# Patient Record
Sex: MALE | Race: WHITE | NOT HISPANIC OR LATINO | Employment: UNEMPLOYED | URBAN - METROPOLITAN AREA
[De-identification: names, ages, dates, MRNs, and addresses within clinical notes are randomized per-mention and may not be internally consistent; named-entity substitution may affect disease eponyms.]

---

## 2017-01-06 ENCOUNTER — COMMUNICATION - HEALTHEAST (OUTPATIENT)
Dept: FAMILY MEDICINE | Facility: CLINIC | Age: 32
End: 2017-01-06

## 2017-01-06 DIAGNOSIS — F41.1 ANXIETY STATE: ICD-10-CM

## 2017-02-07 ENCOUNTER — RECORDS - HEALTHEAST (OUTPATIENT)
Dept: ADMINISTRATIVE | Facility: OTHER | Age: 32
End: 2017-02-07

## 2017-05-27 ENCOUNTER — COMMUNICATION - HEALTHEAST (OUTPATIENT)
Dept: FAMILY MEDICINE | Facility: CLINIC | Age: 32
End: 2017-05-27

## 2017-05-27 DIAGNOSIS — F41.1 ANXIETY STATE: ICD-10-CM

## 2017-06-12 ENCOUNTER — COMMUNICATION - HEALTHEAST (OUTPATIENT)
Dept: FAMILY MEDICINE | Facility: CLINIC | Age: 32
End: 2017-06-12

## 2017-07-03 ENCOUNTER — COMMUNICATION - HEALTHEAST (OUTPATIENT)
Dept: TELEHEALTH | Facility: CLINIC | Age: 32
End: 2017-07-03

## 2017-07-25 ENCOUNTER — OFFICE VISIT - HEALTHEAST (OUTPATIENT)
Dept: FAMILY MEDICINE | Facility: CLINIC | Age: 32
End: 2017-07-25

## 2017-07-25 RX ORDER — LISINOPRIL 10 MG/1
10 TABLET ORAL DAILY
Qty: 90 TABLET | Refills: 1 | Status: SHIPPED | OUTPATIENT
Start: 2017-07-25 | End: 2022-04-05

## 2017-07-27 ENCOUNTER — COMMUNICATION - HEALTHEAST (OUTPATIENT)
Dept: FAMILY MEDICINE | Facility: CLINIC | Age: 32
End: 2017-07-27

## 2017-10-25 ENCOUNTER — COMMUNICATION - HEALTHEAST (OUTPATIENT)
Dept: FAMILY MEDICINE | Facility: CLINIC | Age: 32
End: 2017-10-25

## 2017-10-25 DIAGNOSIS — F41.1 ANXIETY STATE: ICD-10-CM

## 2018-05-31 ENCOUNTER — RECORDS - HEALTHEAST (OUTPATIENT)
Dept: ADMINISTRATIVE | Facility: OTHER | Age: 33
End: 2018-05-31

## 2019-11-07 ENCOUNTER — RECORDS - HEALTHEAST (OUTPATIENT)
Dept: ADMINISTRATIVE | Facility: OTHER | Age: 34
End: 2019-11-07

## 2021-05-05 ENCOUNTER — RECORDS - HEALTHEAST (OUTPATIENT)
Dept: ADMINISTRATIVE | Facility: OTHER | Age: 36
End: 2021-05-05

## 2021-05-31 VITALS — WEIGHT: 205 LBS

## 2021-06-12 NOTE — PROGRESS NOTES
"CHIEF COMPLAINT: Cheikh Mi had concerns including Follow-up.    [place]: 1.............. had concerns including Follow-up.    1. Elevated blood pressure      No problem-specific Assessment & Plan notes found for this encounter.      CC:              F/u high BP, requesting BP med     What's it like:         Pt states high BP during dentist visit, requesting for BP medication to help.   How long is it ongoing:      Ongoing for long time, history of high BP   What makes it worse :         What makes it better:         Old BP medication helped last time pt was on medication   0/10-10/10:Pain/Intesity     0 no pain today       Associated Sx:   No other sx     Patient had several episodes of high blood pressure after being at the dentist  \"Every time he goes as high  No headaches, no dizziness, no fogginess, no palpitations,  No caffeine alcohol use  No exercise  No sleep disturbance or hypersomnia snoring or feeling poor rest  No falling asleep during class  Is currently on Vimpat as well as lamotrigine for seizures since 2011  Dad has hypertension  Occasionally uses marijuana    SUBJECTIVE:  Cheikh Mi is a 32 y.o. male    Past Medical History:   Diagnosis Date     Seizure disorder     2011  Vimpat/Lamotrigine Neuro Dr. Marshall     Past Surgical History:   Procedure Laterality Date     SPINE SURGERY  age  days     Review of patient's allergies indicates no known allergies.  Current Outpatient Prescriptions   Medication Sig Dispense Refill     FLUoxetine (PROZAC) 20 MG capsule TAKE 1 CAPSULE BY MOUTH ONE TIME DAILY 90 capsule 0     ergocalciferol (ERGOCALCIFEROL) 50,000 unit capsule 1 every SUNday 12 capsule 3     lisinopril (PRINIVIL,ZESTRIL) 10 MG tablet Take 1 tablet (10 mg total) by mouth daily. 90 tablet 1     multivitamin therapeutic w/minerals (THERAPEUTIC-M) 27-0.4 mg Tab tablet 1 daily 100 tablet 3     omega-3 fatty acids-vitamin E (FISH OIL) 1,000 mg cap 2 Capsules Daily 60 each 12     No current " facility-administered medications for this visit.      Family History   Problem Relation Age of Onset     Hypertension Mother      Breast cancer Mother      Stage 2      Mental illness Sister      Social History     Social History     Marital status: Single     Spouse name: N/A     Number of children: N/A     Years of education: N/A     Social History Main Topics     Smoking status: Former Smoker     Quit date: 4/7/2015     Smokeless tobacco: Never Used     Alcohol use No     Drug use: Yes     Special: Marijuana     Sexual activity: Yes     Partners: Female     Birth control/ protection: OCP     Other Topics Concern     None     Social History Narrative     Patient Active Problem List   Diagnosis     Anxiety Disorder NOS     Primary Hypersomnia     Dental Disorders     Convulsions     Chronic Sinusitis                                              SOCIAL: He  reports that he quit smoking about 2 years ago. He has never used smokeless tobacco. He reports that he uses illicit drugs, including Marijuana. He reports that he does not drink alcohol.    REVIEW OF SYSTEMS:     FAMILY HISTORY NOT PERTINENT TO CHIEF COMPLAINT OR PRESENTING PROBLEM  Review of systems otherwise negative as requested from patient, except   Positive ROS outlined and discussed in [place].    OBJECTIVE:  /74 (Patient Site: Right Arm, Patient Position: Sitting, Cuff Size: Adult Large)  Pulse (!) 57  Resp 16  Wt 205 lb (93 kg)  SpO2 98%    GENERAL:     No acute distress.   Alert and oriented X 3         Physical:    Carotid pulses are full without bruits  Lungs are clear  Cardiac S1-S2 regular sinus appreciable murmur  Patient's not tachycardic  No cervical or supraclavicular nodes  No tremulousness  No proptosis  Calves are supple no edema normal distal pulses        ASSESSMENT & PLAN      Cheikh was seen today for follow-up.    Diagnoses and all orders for this visit:    Elevated blood pressure  -     Comprehensive Metabolic Panel  -      Parathyroid Hormone Intact  -     Vitamin D, Total (25-Hydroxy)  -     Thyroid Cascade  -     Urinalysis-UC if Indicated  -     Prolactin    Other orders  -     lisinopril (PRINIVIL,ZESTRIL) 10 MG tablet; Take 1 tablet (10 mg total) by mouth daily.   benefits discussed of treatment will have the patient start supplements including fish oil CoQ ALA CoQ 10  FISH OIL    No Follow-up on file.       Anticipatory Guidance and Symptomatic Cares Discussed   Advised to call back directly if there are further questions, or if these symptoms fail to improve as anticipated or worsen.  Return to clinic if patient has a clinical concern that warrants an exam.        25 Min Total Time, > 50% counseling and coordination of Care    Edison Brar MD  Family Medicine   [place] [phone]

## 2022-03-31 ENCOUNTER — TELEPHONE (OUTPATIENT)
Dept: FAMILY MEDICINE | Facility: CLINIC | Age: 37
End: 2022-03-31
Payer: COMMERCIAL

## 2022-03-31 NOTE — TELEPHONE ENCOUNTER
Attempted to call patient, no answer. Left message to call clinic and schedule appointment ASAP as Dr Brar has a full schedule.

## 2022-03-31 NOTE — TELEPHONE ENCOUNTER
Reason for Call:  Same Day Appointment, Requested Provider:  Edison Brar    PCP: Edison Brar    Reason for visit: pt stated he needs to be seen asap. Needs a note for work stating he is dealing with depression and was off work for the last couple of days. Needs note by next week. Requesting to be seen tomorrow 4/1.    Duration of symptoms:     Have you been treated for this in the past?     Additional comments:     Can we leave a detailed message on this number? YES    Phone number patient can be reached at: [phone]    Best Time:     Call taken on 3/31/2022 at 1:20 PM by Janie Pratt

## 2022-04-05 ENCOUNTER — OFFICE VISIT (OUTPATIENT)
Dept: FAMILY MEDICINE | Facility: CLINIC | Age: 37
End: 2022-04-05
Payer: COMMERCIAL

## 2022-04-05 VITALS
SYSTOLIC BLOOD PRESSURE: 133 MMHG | WEIGHT: 181 LBS | OXYGEN SATURATION: 97 % | TEMPERATURE: 97.7 F | DIASTOLIC BLOOD PRESSURE: 89 MMHG | HEART RATE: 89 BPM | RESPIRATION RATE: 18 BRPM

## 2022-04-05 DIAGNOSIS — F41.1 ANXIETY STATE: ICD-10-CM

## 2022-04-05 DIAGNOSIS — F43.21 ADJUSTMENT DISORDER WITH DEPRESSED MOOD: Primary | ICD-10-CM

## 2022-04-05 DIAGNOSIS — R56.9 CONVULSIONS, UNSPECIFIED CONVULSION TYPE (H): ICD-10-CM

## 2022-04-05 PROCEDURE — 96127 BRIEF EMOTIONAL/BEHAV ASSMT: CPT | Performed by: FAMILY MEDICINE

## 2022-04-05 PROCEDURE — 99204 OFFICE O/P NEW MOD 45 MIN: CPT | Performed by: FAMILY MEDICINE

## 2022-04-05 RX ORDER — ESCITALOPRAM OXALATE 10 MG/1
TABLET ORAL
Qty: 90 TABLET | Refills: 1 | Status: SHIPPED | OUTPATIENT
Start: 2022-04-05 | End: 2022-10-16

## 2022-04-05 ASSESSMENT — PATIENT HEALTH QUESTIONNAIRE - PHQ9
SUM OF ALL RESPONSES TO PHQ QUESTIONS 1-9: 13
SUM OF ALL RESPONSES TO PHQ QUESTIONS 1-9: 13
10. IF YOU CHECKED OFF ANY PROBLEMS, HOW DIFFICULT HAVE THESE PROBLEMS MADE IT FOR YOU TO DO YOUR WORK, TAKE CARE OF THINGS AT HOME, OR GET ALONG WITH OTHER PEOPLE: VERY DIFFICULT

## 2022-04-05 ASSESSMENT — ANXIETY QUESTIONNAIRES
1. FEELING NERVOUS, ANXIOUS, OR ON EDGE: MORE THAN HALF THE DAYS
GAD7 TOTAL SCORE: 14
3. WORRYING TOO MUCH ABOUT DIFFERENT THINGS: MORE THAN HALF THE DAYS
5. BEING SO RESTLESS THAT IT IS HARD TO SIT STILL: MORE THAN HALF THE DAYS
GAD7 TOTAL SCORE: 14
7. FEELING AFRAID AS IF SOMETHING AWFUL MIGHT HAPPEN: SEVERAL DAYS
4. TROUBLE RELAXING: NEARLY EVERY DAY
6. BECOMING EASILY ANNOYED OR IRRITABLE: MORE THAN HALF THE DAYS
GAD7 TOTAL SCORE: 14
7. FEELING AFRAID AS IF SOMETHING AWFUL MIGHT HAPPEN: SEVERAL DAYS
2. NOT BEING ABLE TO STOP OR CONTROL WORRYING: MORE THAN HALF THE DAYS

## 2022-04-05 NOTE — PATIENT INSTRUCTIONS
Thanks for coming in Cheikh    Start the Lexapro 10 mg 1/2 tablet daily for 14 days then 1 tablet daily    Consider melatonin 5 mg at target bedtime I would say 945 for you    Stay active  Increase your fruit and vegetable intake  Shoot for 8 hours of sleep

## 2022-04-05 NOTE — PROGRESS NOTES
"Radhapamella Lujan   Clemente Salamanca is a 36 year old who presents for the following health issues        Mental Health Problem    History of Present Illness       Mental Health Follow-up:  Patient presents to follow-up on Depression & Anxiety.Patient's depression since last visit has been:  Worse  The patient is not having other symptoms associated with depression.  Patient's anxiety since last visit has been:  No change  The patient is not having other symptoms associated with anxiety.  Any significant life events: relationship concerns, job concerns and grief or loss  Patient is feeling anxious or having panic attacks.  Patient has no concerns about alcohol or drug use.       Today's PHQ-9         PHQ-9 Total Score: 13  PHQ-9 Q9 Thoughts of better off dead/self-harm past 2 weeks :   (P) Not at all    How difficult have these problems made it for you to do your work, take care of things at home, or get along with other people: Very difficult    Today's HOSSEIN-7 Score: 14    Hypertension: He presents for follow up of hypertension.  He does not check blood pressure  regularly outside of the clinic. Outside blood pressures have been over 140/90. He does not follow a low salt diet.     He eats 0-1 servings of fruits and vegetables daily.He consumes 2 sweetened beverage(s) daily.He exercises with enough effort to increase his heart rate 9 or less minutes per day.  He exercises with enough effort to increase his heart rate 3 or less days per week. He is missing 1 dose(s) of medications per week.     Needs letter for work stating he was seen today     Abnormal Mood Symptoms    Duration: depression for the past month maybe longer     Description:  Depression: YES  Anxiety: YES, \"I've always had that\".   Panic attacks: no     Accompanying signs and symptoms: see PHQ-9 and HOSSEIN scores    History (similar episodes/previous evaluation): None    Precipitating or alleviating factors: None    Therapies " tried and outcome: none. Stopped taking Fluoxetine about a year ago   Stopped taking seizure medications        Problem List Items Addressed This Visit        Nervous and Auditory    Convulsions     Last seizure 4/18/2015 just got his license back    Seizures for years  AB sounds  Grandma  Otherwise feeling well    Interested in therapy for depression  Always feels like that  No panic attacks  Stopped taking fluoxetine about a year ago does not feel it was helpful    Risk benefits discussed    No seizure therapy at the present time    For depression  Consider a therapist    Start Lexapro 10 mg 1/2 tablet daily for total of 14 days then 1 tablet daily    Connect by Jennifer                Other    Anxiety Disorder NOS     Currently more anxiety  Not able to see his kids  Frustrated because he is having difficulty making arrangements  Was living with his folks  They moved to [place]  Continue to work with his ask Shanel  Children doing well otherwise    Has tried fluoxetine in the past as a remember how he reacted    Not suicidal    Plan  Risk benefits discussed  Lexapro 5 mg daily for 2 weeks then  Lexapro 10 mg daily  Connect by Jennifer    Note for work today off 3/29/2022 through 4/5/2022                   Notes from today   for [employer]  3 kids go to  with ask ages to 11 and 12  Parents moved up [place]  No car  45 hours of sleep  Occasional cannabis no drugs only social drinking        Review of Systems   Down depressed        Objective    /89   Pulse 89   Temp 97.7  F (36.5  C) (Oral)   Resp 18   Wt 82.1 kg (181 lb)   SpO2 97%   There is no height or weight on file to calculate BMI.  Physical Exam

## 2022-04-05 NOTE — ASSESSMENT & PLAN NOTE
Last seizure 4/18/2015 just got his license back    Seizures for years  AB sounds  Grandma  Otherwise feeling well    Interested in therapy for depression  Always feels like that  No panic attacks  Stopped taking fluoxetine about a year ago does not feel it was helpful    Risk benefits discussed    No seizure therapy at the present time    For depression  Consider a therapist    Start Lexapro 10 mg 1/2 tablet daily for total of 14 days then 1 tablet daily    Connect by Jennifer

## 2022-04-05 NOTE — ASSESSMENT & PLAN NOTE
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

## 2022-04-06 ASSESSMENT — ANXIETY QUESTIONNAIRES: GAD7 TOTAL SCORE: 14

## 2022-04-06 ASSESSMENT — PATIENT HEALTH QUESTIONNAIRE - PHQ9: SUM OF ALL RESPONSES TO PHQ QUESTIONS 1-9: 13

## 2023-03-15 ENCOUNTER — OFFICE VISIT (OUTPATIENT)
Dept: URGENT CARE | Facility: URGENT CARE | Age: 38
End: 2023-03-15
Payer: COMMERCIAL

## 2023-03-15 VITALS
WEIGHT: 185 LBS | SYSTOLIC BLOOD PRESSURE: 160 MMHG | HEART RATE: 91 BPM | OXYGEN SATURATION: 99 % | TEMPERATURE: 100 F | DIASTOLIC BLOOD PRESSURE: 103 MMHG

## 2023-03-15 DIAGNOSIS — R03.0 ELEVATED BLOOD PRESSURE READING: ICD-10-CM

## 2023-03-15 DIAGNOSIS — M54.6 ACUTE MIDLINE THORACIC BACK PAIN: Primary | ICD-10-CM

## 2023-03-15 PROCEDURE — 99213 OFFICE O/P EST LOW 20 MIN: CPT

## 2023-03-15 RX ORDER — CYCLOBENZAPRINE HCL 10 MG
10 TABLET ORAL 3 TIMES DAILY PRN
Qty: 20 TABLET | Refills: 0 | Status: SHIPPED | OUTPATIENT
Start: 2023-03-15

## 2023-03-15 NOTE — PATIENT INSTRUCTIONS
Diagnosis: Back sprain/strain,    Plan:   RICE: rest, ice / heat - alternating, gentle stretching   Muscle relaxants}   Ibuprofen and tylenol for pain today (mobic, naproxen) (Celebrex - cox2- GI upset/bleeding)   Can try: creams and rubs- lidocaine, Voltaren   Lidocaine patches   Acute back pain from a sprain or strain usually gets better in 1 to 2 weeks.   Will refer you to spine for further follow up and treatment regarding your back pain - make apt, if better can cancel, if not keep and try:   Physical therapy, acupuncture, chiropractic, injections,   Ortho: will discuss stronger medications such as narcotics   Monitor for:   Monitor for pain that worsens and does not improve over 2-6 weeks   Numbness and tingling down leg   Loss of function of bowel or bladder   New fevers, chills, sweats   Increased weakness or loss of strength in legs     Back Pain   Back pain is one of the most common problems. The good news is that most people feel better in 1 to 2 weeks, and most of the rest in 1 to 2 months.   Most people can remain active.  People who have pain describe it differently--not everyone is the same.  The pain can be sharp, stabbing, shooting, aching, cramping or burning.  Movement, standing, bending, lifting, sitting, or walking may worsen pain.  It can be limited to one spot or area, or it can be more generalized.  It can spread upwards, to the front, or go down your arms or legs (sciatica).  It can cause muscle spasm.  Most of the time, mechanical problems with the muscles or spine cause the pain.   Mechanical problems are usually caused by an injury to the muscles or ligaments.   Illness can cause back pain, but it's usually not caused by a serious illness.   Mechanical problems include:   Physical activity such as sports, exercise, work, or normal activity  Overexertion, lifting, pushing, pulling incorrectly or too aggressively  Sudden twisting, bending, or stretching from an accident, or accidental  movement  Poor posture  Stretching or moving wrong, without noticing pain at the time  Poor coordination, lack of regular exercise (check with your doctor about this)  Spinal disc disease or arthritis  Stress  The pain can also be related to pregnancy, or illness like appendicitis, bladder or kidney infections, pelvic infections, and many other things.    Other things to try:   When in bed, try to find a position of comfort. A firm mattress is best. Try lying flat on your back with pillows under your knees. You can also try lying on your side with your  knees bent up toward your chest and a pillow between your knees.  Don't sit for long periods, as in a long car ride or during other travel. This puts more stress on the lower back than standing or walking.  You can alternate ice and heat therapy.   Therapeutic massage can help relax the back muscles without stretching them.  Be aware of safe lifting methods and don't lift anything without stretching first.

## 2023-03-15 NOTE — PROGRESS NOTES
URGENT CARE  Assessment & Plan   Assessment:   Cheikh Mi is a 37 year old male who's clinical presentation today is consistent with:   1. Acute midline thoracic back pain  - cyclobenzaprine (FLEXERIL) 10 MG tablet; Take 1 tablet (10 mg) by mouth 3 times daily as needed for muscle spasms  Dispense: 20 tablet; Refill: 0    No alarm signs or symptoms present   Differential Diagnoses for this patient's CC include    fracture, dislocation  Ligamentous vs tendon pathology, musculoskeletal injury, soft tissue injury    Plan:  Will treat patient at this time symptomatically and supportively, this will include encouraging: using NSAIDs/Tylenol  to help decrease pain and inflammation, resting, applying ice as needed.   Given the patient's presentation today suspect disc involvement and thus will have the patient try: Muscle relaxants. Further encouraged creams and rubs such as lidocaine or Voltaren    We discussed that acute back pain from a strain or sprain usually gets better in 1-2 weeks but back pain from disc disease, arthritis or spinal canal narrowing can last much longer, for this reason I encouraged the patient to  follow-up with their PCP for further evaluation and treatment if no improvement in the next 2-3 weeks. However if symptoms worsen they should follow up immediately; educated them to monitor for worsening symptoms such as: new weakness, numbness or tingling; new fever/chills, loss of strength in legs; or loss of bowel or bladder function.     Patient  is  agreeable to treatment plan and state they will follow-up if symptoms do not improve and/or if symptoms worsen (see patient's AVS 'monitor for' section for specific patient instructions given and discussed regarding what to watch for and when to follow up)    Medications ordered are listed above, please see AVS for patient's specific and personalized discharge instructions given     BRUCE Pulido [facility]  [facility]      ______________________________________________________________________        Subjective  Subjective     HPI: Cheikh Mi  is a 37 year old  male who presents today for evaluation the following concerns:   Patient presents with acute back pain which started 4 days ago, approximately 3/11/23   Patient states he fell while walking and slipped on the ice   Patient endorses pain that is not  radiating down his  legs   Patient denies any changes of bowel or bladder. Patient denies any numbness or tingling    The patient has tried resting at home  to help alleviate the pain.  Patient states helpful was minimal .   He denies associated abdominal pain, difficulty with bowel movements, difficulty with urination, fevers       No Known Allergies  Patient Active Problem List   Diagnosis     Anxiety Disorder NOS     Dental Disorders     Convulsions       Review of Systems:  Pertinent review of systems as reflected in HPI, otherwise negative.     Objective  Objective    Physical Exam:  Vitals:    03/15/23 1149 03/15/23 1154   BP: (!) 168/106 (!) 160/103   Pulse: 91    Temp: 100  F (37.8  C)    TempSrc: Tympanic    SpO2: 99%    Weight: 83.9 kg (185 lb)       General:   alert and oriented, no acute distress, non-ill-appearing   Vital signs reviewed: afebrile, elevated blood pressure    Psy/mental status: pleasant   SKIN: intact   GI/: wnl, normal   MSK: Motion intact, strength adequate for age, no edema peripheral pulses +2 and equal bilaterally,  Toe walk: normal .  Heel walk: normal .  Back:      Inspection: normal posture        Palpation: tenderness over thoratic and lumbar paraspinals bilaterally         ROM: forward flexion to full range,       extension to full range, sidebending left to full range, sidebending right to full range,       rotation right to full range, and rotation left to full range.      Straight leg raises: negative       Strength: +3/4 bilaterally       Sensation: normal.  Neuro:  motor,  reflexes, cerebellar function, gait and coordination are all within normal limits, no numbness noted      I explained my diagnostic considerations and recommendations to the patient, who voiced understanding and agreement with the treatment plan.   All questions were answered.   We discussed potential side effects, risks and benefits of any prescribed or recommended therapies, as well as expectations for response to treatments.  Please see AVS for any patient instructions & handouts given.   Patient was advised to contact the Nurse Care Line, their Primary Care provider, Urgent Care, or the Emergency Department if there are new or worsening symptoms, or call 911 for emergencies.        ______________________________________________________________________          Patient Instructions   Diagnosis: Back sprain/strain,    Plan:     RICE: rest, ice / heat - alternating, gentle stretching     Muscle relaxants}     Ibuprofen and tylenol for pain today (mobic, naproxen) (Celebrex - cox2- GI upset/bleeding)     Can try: creams and rubs- lidocaine, Voltaren     Lidocaine patches     Acute back pain from a sprain or strain usually gets better in 1 to 2 weeks.     Will refer you to spine for further follow up and treatment regarding your back pain - make apt, if better can cancel, if not keep and try:     Physical therapy, acupuncture, chiropractic, injections,     Ortho: will discuss stronger medications such as narcotics   Monitor for:   1. Monitor for pain that worsens and does not improve over 2-6 weeks   2. Numbness and tingling down leg   3. Loss of function of bowel or bladder   4. New fevers, chills, sweats   5. Increased weakness or loss of strength in legs     Back Pain   Back pain is one of the most common problems. The good news is that most people feel better in 1 to 2 weeks, and most of the rest in 1 to 2 months.   Most people can remain active.  People who have pain describe it differently--not everyone is the  same.  1. The pain can be sharp, stabbing, shooting, aching, cramping or burning.  2. Movement, standing, bending, lifting, sitting, or walking may worsen pain.  3. It can be limited to one spot or area, or it can be more generalized.  4. It can spread upwards, to the front, or go down your arms or legs (sciatica).  5. It can cause muscle spasm.  Most of the time, mechanical problems with the muscles or spine cause the pain.   Mechanical problems are usually caused by an injury to the muscles or ligaments.   Illness can cause back pain, but it's usually not caused by a serious illness.   Mechanical problems include:     Physical activity such as sports, exercise, work, or normal activity    Overexertion, lifting, pushing, pulling incorrectly or too aggressively    Sudden twisting, bending, or stretching from an accident, or accidental movement    Poor posture    Stretching or moving wrong, without noticing pain at the time    Poor coordination, lack of regular exercise (check with your doctor about this)    Spinal disc disease or arthritis    Stress    The pain can also be related to pregnancy, or illness like appendicitis, bladder or kidney infections, pelvic infections, and many other things.    Other things to try:     When in bed, try to find a position of comfort. A firm mattress is best. Try lying flat on your back with pillows under your knees. You can also try lying on your side with your  knees bent up toward your chest and a pillow between your knees.    Don't sit for long periods, as in a long car ride or during other travel. This puts more stress on the lower back than standing or walking.    You can alternate ice and heat therapy.     Therapeutic massage can help relax the back muscles without stretching them.    Be aware of safe lifting methods and don't lift anything without stretching first.